# Patient Record
(demographics unavailable — no encounter records)

---

## 2019-01-09 NOTE — ULT
PRELIMINARY REPORT/[facility]/EMERGENTY AFTER-HOURS PROCEDURE 

 

US Scrotum

 

EXAM DATE/TIME:

1/9/2019 3:16 AM

 

CLINICAL HISTORY:

27 years old, male; Pain; Scrotum pain; Patient HX: RT teste pain x 4 hrs, pain started after running
 7 miles

 

TECHNIQUE:

Real-time ultrasound of the scrotum and contents with color Doppler and image documentation.

 

COMPARISON:

No relevant prior studies available.

 

FINDINGS:

The testicles are within normal limits and symmetrical in size.

 

Right:

No visible intratesticular mass.

Duplex Doppler evaluation, with color flow and spectral waveform analysis, demonstrates intratesticul
ar arterial and venous blood flow.

The right epididymis is normal in size and appearance.

There is a small amount of right scrotal fluid.

There appears be a right-sided varicocele.

 

Left:

No visible intratesticular mass.

Duplex Doppler evaluation, with color flow and spectral waveform analysis, demonstrates intratesticul
ar arterial and venous blood flow.

The left epididymis is normal in size and appearance.

There is a small amount of left scrotal fluid.

 

IMPRESSION:

1. No evidence for torsion by Doppler ultrasound.

2. No findings to suggest epididymitis.

3. Small bilateral hydroceles.

4. Right-sided varicocele.

5. Other details discussed above.

 

Thank you for allowing us to participate in the care of your patient.

Dictated and Authenticated by: Link Caballero MD

01/09/2019 3:39 AM Central Time ([place] & [place])

 

 

 

FINAL REPORT

TESTICULAR ULTRASOUND:

 

Date:  01/09/19 

 

FINDINGS/IMPRESSION: 

I agree with the preliminary report provided by Deng. Please see the preliminary report for full deta
ils. 

 

 

 

POS: [facility]

## 2019-01-11 NOTE — CT
CT ABDOMEN AND PELVIS NONCONTRAST:

CT RENAL CALCULUS PROTOCOL:

 

INDICATIONS:

Right costovertebral angle pain.

 

COMPARISON:

Reference made to 03/14/2014 CT exam.

 

FINDINGS:

There are faint areas of density within each kidney that suggest medullary nephrocalcinosis.  In janki
tion, punctate hyperdensity of the kidneys may also relate to minute nephrolithiasis.  There is no ob
structive uropathy present.  The urinary bladder is decompressed and unopacified.  No evidence of ure
teral dilatation.  The lung bases are clear, where visualized.  Moderate retained fecal material of t
he colon is present.  The solid abdominal organs, bowel, lymph nodes, and vasculature are limited in 
assessment by the noncontrast technique.  No acute osseous pathology is visualized.  There are areas 
of endplate irregularity with Schmorl's node formation, chronic in appearance.

 

IMPRESSION:

1.  Findings would suggest nephrocalcinosis/nephrolithiasis without associated obstructive uropathy.

 

2.  Evaluation otherwise limited on the basis of noncontrast technique.

 

POS: [facility]

## 2019-01-12 NOTE — ULT
TESTICULAR ULTRASOUND INCLUDING COLOR AND SPECTRAL DOPPLER IMAGIN19

 

HISTORY: 

Right testicular pain, history of intermittent testicular torsion.

 

COMPARISON:  

19. 

 

FINDINGS:  

Right testis measures 4.6 x 3.2 x 2.3 cm. Left testis measures 4.4 x 3.1 x 2.8 cm. The right epididym
is is somewhat larger than the left. Small bilateral hydroceles are noted. There is normal vascular f
low with arterial inflow and venous outflow into both testis. No evidence for testicular torsion. 

 

IMPRESSION:  

No evidence for testicular torsion. Slightly enlarged right epididymis compared to the left. Small hy
droceles. No convincing evidence for epididymitis. 

 

 

 

POS: [facility]

## 2019-01-13 NOTE — HP
REASON FOR ADMISSION:  Intractable right groin and testicular pain.



CHIEF COMPLAINT:  "My testicle hurts so bad."



HISTORY OF PRESENT ILLNESS:  Mr. Calabrese is a 27-year-old white male, who is

currently a patient of Dr. Aylin Syed.  Dr. Syed had previously seen him for

several issues including some urinary frequency issues as well as chronic right

testicular pain and microhematuria.  Of these issues, the biggest most concerning to

the patient is his right testicular pain.  He states that the pain is severely

uncontrolled and has been hurting off and on for several years.  He claims he has

had testicular torsion in the past, although, this was just fixed by external

rotation without orchiopexy.  He called me over the weekend and claimed that he was

having intractable uncontrolled right testicular pain, and I told him that he needed

to go to the emergency room.  He was reluctant at first, stating that they never do

anything for him, but after telling him that he may have another torsion and claimed

I could not do anything other for him on the phone.  He agreed to go to the ER.  At

arrival to the ER, he underwent a scrotal ultrasound, which did not demonstrate any

evidence of torsion, and he also had a CT scan the day prior to that, which had been

scheduled, which suggested nephrocalcinosis and nephrolithiasis without obstruction

or ureteral calculi without any solid masses or other concerning features.  Due to

his uncontrolled pain, he was admitted to the hospital for further pain control.

Anesthesia was consulted for assistance as the patient has a long history of chronic

pain with high risk of potential abuse and dependency issues.  He actually has had

narcotic dependency and he states that this has been in the case, and he has been on

Suboxone previously due to this.  He has a history of kyphosis with bulging disks,

has had a sacroiliitis and other chronic pain issues including migraines for which

he has been on high-dose narcotics for long periods of time.  He states that he has

become tolerant to narcotics over this time frame and requires higher doses than

what would normally be considered for generalized population.  Currently, he states

his right testicular pain is so bad that he can barely tolerate.  He is currently on

a Dilaudid PCA and states that while it is helping, the pain is still not well

controlled.  He has not yet had a cord block, which Dr. Syed had talked about

doing for a possible orchiectomy.  The patient also states that he was planning a

cystoscopy possibly for his microhematuria, which has also not been done yet. 



ALLERGIES:  

1. PHENERGAN.

2. CODEINE.

3. TORADOL.



HOME MEDICATIONS:  

1. Klonopin.

2. Ranitidine.



PAST MEDICAL HISTORY:  

1. Gastroesophageal reflux disease.

2. Migraines.

3. Insomnia.

4. Anxiety.

5. Opiate dependency.

6. Chronic back pain.



PAST SURGICAL HISTORY:  

1. Sinus surgery.

2. Radiofrequency ablation for migraines.

3. Hyperhidrosis procedure.

4. Spinal blocks.



FAMILY HISTORY:  Significant for migraines and anxiety.



SOCIAL HISTORY:  The patient vapes, but does not smoke cigarettes.  Uses alcohol

rarely.  Has used marijuana, which he states was for migraines, but denies any other

illicit drugs. 



REVIEW OF SYSTEMS:  A 12-point review of systems is unremarkable other than what was

commented on the HPI.  Specifically, his right groin pain which he states is

extremely severe at this time.  He denies any nausea, vomiting, chest pain, or

shortness of breath.  He also has had some loose stools and requested some

Pepto-Bismol for this, but denies any constipation. 



PHYSICAL EXAMINATION:

VITAL SIGNS:  Temperature 98, pulse 67, respirations 18, blood pressure 133/89, and

saturations 94% on room air. 

GENERAL:  The patient appears slightly somnolent, but otherwise communicative and

answering questions appropriately.  He appears to be in quite a bit of pain. 

HEENT:  Normocephalic and atraumatic.  Sclerae nonicteric.  Pupils are small.

Eyelids are somewhat droopy.  Trachea midline.  Moist mucous membranes. 

CARDIOVASCULAR:  Regular rate and rhythm.  Normal S1 and S2.  Symmetric pulses. 

CHEST:  No increased work of breathing.  Symmetric expansion. 

LUNGS:  Clear anteriorly. 

ABDOMEN:  Soft, nontender, and nondistended.  Positive bowel sounds.  No

organomegaly.  No peritoneal signs, rebound, or guarding. 

GENITOURINARY:  The patient is circumcised.  Penis is normal without lesions.  Both

testicles are descended.  There is no overlying scrotal erythema or induration.

Left testicle is normal without significant tenderness.  Right testicle is tender to

palpation with more tenderness in the inferior and superior aspects.  The cord is

slightly tender.  The highest amount of pain occurs with compression on the

external-inguinal ring and inguinal ligament. 

EXTREMITIES:  No clubbing, cyanosis, or edema. 

MUSCULOSKELETAL:  No obvious joint deformities or joint erythema noted.  Full range

of motion. 

NEUROLOGIC:  Cranial nerves 2 through 12 appear grossly intact.  No focal or sensory

deficits identified. 

PSYCHIATRIC:  Somnolent, but oriented x3.  The patient appears to be extremely

miserable with reported pain greater than what is being seen on exam or physical

findings. 



LABORATORY DATA:  On laboratory evaluation, the full set of labs in the [facility]

system which I have reviewed.  Of note, the patient's white count is 6.2 with a

hemoglobin of 14.5.  Creatinine is 0.87.  Electrolytes are otherwise normal.

Urinalysis is completely negative without any hematuria. 



DIAGNOSTIC DATA:  On imaging, again CT from January 11th demonstrates

nephrocalcinosis and nephrolithiasis without obstruction or hydronephrosis and no

solid masses.  Ultrasound from January 12th demonstrates no evidence of testicular

torsion with a slightly enlarged right epididymis compared to the left with small

bilateral hydroceles. 



ASSESSMENT AND PLAN:  A 27-year-old white male with intractable right groin and

testicular pain with majority of the pain being toward the upper part of the

spermatic cord.  I am not as familiar with this patient, but there is some concern

for possible drug-seeking behavior; although, he admits to having had narcotic

dependency in the past.  At the current time, he does not have hematuria, but he

apparently had reported hematuria previously.  He has not yet had a cord block,

which I think would be necessary before decision for an orchiectomy were to be made.

 Given that the patient is kind of an unusual circumstance, I will defer surgical

treatment of possible orchiectomy to Dr. Syed, who is his primary urologist.  For

now, I have consulted Anesthesia, who does not feel comfortable increasing his

Dilaudid PCA, but instead he is going to start him on Ofirmev and nonnarcotic based

pain control measures to try and further control his pain until he can get to see

Dr. Syed tomorrow, who will resume his care.  We will make him n.p.o. after

midnight in case any procedures need to be planned. 







Job ID:  [id number]

## 2019-01-15 NOTE — DIS
DATE OF ADMISSION:  01/12/2019



DATE OF DISCHARGE:  01/14/2019



ADMITTING DIAGNOSIS:  Intractable right testicular pain.



DISCHARGE DIAGNOSES:  Intractable right testicular pain with concern for opioid

dependence if not abuse. 



HOSPITAL COURSE:  The patient is a 27-year-old male, I saw in the office on Friday,

after he had been in the ER a few days prior with right testicular pain. He has a

long drawn out history of this since at least 2014, where he has had prior CTs and

prior ultrasounds, none of which are concerning for torsion or lack of flow nor

mass, but these suggest potential for increase flow for possible epididymitis. When

he saw me in the office, he is asking for pain medicine and I told him that I would

only give him pain medicine  if he was found to have a stone and a CT scan

immediately after he saw me ruled that out, so I told him he needed to follow up for

cystoscopy in the office, which he had planned for today. However, over the weekend,

he was admitted through the ER by Dr. Fontana for pain and he got  a PCA with

Dilaudid and asked for this to be increased as this still is not helping. When he

discussed the patient with me, I told him my concerns for opioid abuse and he found

further documentation that he was let go by prior primary doctors for concerns

related to this and I relayed to him that the patient had blood around his mouth,

that had dried in the office visit with me and could not explain why other than

chapped lips, while his urine from that day showed both blood, but no infection as

well as tested positive for marijuana, opioids, and benzodiazepines. Given all this

information, Dr. Fontana and I agree that we would stop the PCA, give him only oral

pain medicine overnight and then I would reassess his condition in the morning. 



When I walked in, he looked miserable in the bed. However, as we continued to talk,

his disposition returned less from pain and more for being disgruntled as I relayed

to him my concerns for him not being completely truthful, both about his medications

and prior history. I told him that I was concerned that he had opioid dependency or

at least abuse problems and for this reason, I would not agree to take out his

testicle. However, I do believe that he does have right-sided testicular pain and I

am happy to complete the workup, which would include office cystoscopy to rule out

stricture and if he responds well to a cord block, then consider orchiectomy at this

point, and this was relayed to him. He reported that he had that done in the office

a few months ago by another urologist and I asked him to get these results to me so

that I would not have to repeat the exam for him. 



I also relayed to him that I am concerned if I cannot trust the patient that I would

not want to do a life changing procedure by removing 1 of his organs until he has

established a better relationship with me and for this reason, he is happy to seek

his care from somewhere else, but as I stated him already I would be happy to

complete the workup and consider an orchiectomy in the future if he is proven to be

a trustworthy and compliant patient. We discussed that he would be best off being

discharged because he will not get further narcotics or anything in the hospital

that he cannot get at home. So at this time, he is being discharged and he can

follow up with me in the office for cystoscopy or provide documentation that he

already had this in the past year or he can follow up with another urologist. 







Job ID:  [id number]